# Patient Record
Sex: MALE | Race: WHITE | NOT HISPANIC OR LATINO
[De-identification: names, ages, dates, MRNs, and addresses within clinical notes are randomized per-mention and may not be internally consistent; named-entity substitution may affect disease eponyms.]

---

## 2022-12-26 PROBLEM — Z00.00 ENCOUNTER FOR PREVENTIVE HEALTH EXAMINATION: Status: ACTIVE | Noted: 2022-12-26

## 2022-12-27 ENCOUNTER — APPOINTMENT (OUTPATIENT)
Dept: NEUROSURGERY | Facility: CLINIC | Age: 47
End: 2022-12-27

## 2022-12-27 ENCOUNTER — NON-APPOINTMENT (OUTPATIENT)
Age: 47
End: 2022-12-27

## 2022-12-27 VITALS
WEIGHT: 180 LBS | OXYGEN SATURATION: 99 % | HEIGHT: 68 IN | DIASTOLIC BLOOD PRESSURE: 66 MMHG | TEMPERATURE: 97 F | RESPIRATION RATE: 18 BRPM | HEART RATE: 88 BPM | SYSTOLIC BLOOD PRESSURE: 110 MMHG | BODY MASS INDEX: 27.28 KG/M2

## 2022-12-27 DIAGNOSIS — C71.9 MALIGNANT NEOPLASM OF BRAIN, UNSPECIFIED: ICD-10-CM

## 2022-12-27 DIAGNOSIS — R56.9 UNSPECIFIED CONVULSIONS: ICD-10-CM

## 2022-12-27 PROCEDURE — 99204 OFFICE O/P NEW MOD 45 MIN: CPT

## 2022-12-27 RX ORDER — DEXAMETHASONE 2 MG/1
2 TABLET ORAL
Refills: 0 | Status: ACTIVE | COMMUNITY

## 2022-12-27 RX ORDER — FLUOXETINE HYDROCHLORIDE 10 MG/1
10 CAPSULE ORAL
Refills: 0 | Status: ACTIVE | COMMUNITY

## 2022-12-27 RX ORDER — LACOSAMIDE 200 MG/1
200 TABLET ORAL
Refills: 0 | Status: ACTIVE | COMMUNITY

## 2022-12-27 NOTE — HISTORY OF PRESENT ILLNESS
[de-identified] : Genaro is a 47 year old male who initially presented with 2x witnessed seizure in November '22. He had an MRI performed which showed a low-grade glioma. Patient underwent subtotal resection of the right temporal lobe on 11/14/22. 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 Genetic testing came back and revealed glioblastoma IDH wild-type, TERT, +7, loss of 10q.\par \par In brief:\par 11/4/22: Admitted to hospital following seizure\par 11/14/22: subtotal resection of right temporal lobe ([facility])\par PATH: Glioblastoma, CNS WHO grade IV, IDH wild-type, MGMT unmethylated, CDK +, MDM4 +, TERT mutant,  TP53-, GFAP +\par 12/29/22: chemoRT start\par \par \par Care Team:\par Rad/Onc: Dr. Ortiz/ Dr. Padilla\par Neurosurgery: Dr. Brown\par Neurology: Dr. Jasso\par Epilepsy: Dr. Chadwick\par \par TODAY 12/27/22: Patient presents with his wife to discuss eligible clinical trials ( {MIMIVAX). He denies any changes in his vision but endorses  having short term memory loss. He was previously working as a  and had an interview on 11/4/22 for which he was stressed about. Following the interview, he experienced a witnessed seizure by his wife. In his own words, he woke up in the hospital two days later and was found to have a glioma. He was intially told that is was a low grade astrocytoma. However, once final pathology came through he was confirmed to have glioblastoma. He plans on starting chemo/RT on Thursday 12/29/22.  \par \par

## 2022-12-27 NOTE — DATA REVIEWED
[de-identified] : MRI brain performed on 11/11/22 at [facility]

## 2022-12-27 NOTE — PHYSICAL EXAM
[General Appearance - Alert] : alert [General Appearance - In No Acute Distress] : in no acute distress [Oriented To Time, Place, And Person] : oriented to person, place, and time [Affect] : the affect was normal [Mood] : the mood was normal [Person] : oriented to person [Place] : oriented to place [Time] : oriented to time [Fluency] : fluency intact [Comprehension] : comprehension intact [Motor Tone] : muscle tone was normal in all four extremities [Motor Strength] : muscle strength was normal in all four extremities [] : no respiratory distress [Abnormal Walk] : normal gait [Short Term Intact] : short term memory impaired [Reading] : difficulty reading [Limited Balance] : balance was intact

## 2022-12-27 NOTE — ASSESSMENT
[FreeTextEntry1] : My impression is that the patient suffers from a glioblastoma. The patient's established problem of seizure is controlled. His KPS is 100. I had a long discussion with the patient regarding the role of eligible clinical trials such as MIMIVAX and CAPTEM. The patient is planning on starting chemo/RT this upcoming Thursday (12/29/22). The patient was extensively educated about the nature of his disease process. I will see the patient back once they have read through the consent and made a decision about treatment. I have explained the alternatives, risks and benefits to the patient and she understands and agrees to proceed.

## 2022-12-27 NOTE — REVIEW OF SYSTEMS
[Anxiety] : anxiety [Memory Lapses or Loss] : memory loss [Seizures] : convulsions [Feeling Poorly] : not feeling poorly [Feeling Tired] : not feeling tired [Depression] : no depression [Confused or Disoriented] : no confusion [Changed Thought Patterns] : no change in thought patterns [Arm Weakness] : no arm weakness [Leg Weakness] : no leg weakness [Numbness] : no numbness [Tingling] : no tingling [Dizziness] : no dizziness [Cluster Headache] : no cluster headache [Difficulty Walking] : no difficulty walking [Eyesight Problems] : no eyesight problems [Chest Pain] : no chest pain

## 2022-12-29 ENCOUNTER — TRANSCRIPTION ENCOUNTER (OUTPATIENT)
Age: 47
End: 2022-12-29

## 2024-05-07 ENCOUNTER — APPOINTMENT (OUTPATIENT)
Dept: OPHTHALMOLOGY | Facility: CLINIC | Age: 49
End: 2024-05-07